# Patient Record
(demographics unavailable — no encounter records)

---

## 2025-05-06 NOTE — ASSESSMENT
[Use of independent historian: [ enter independent historian's relationship to patient ] :____] : As the patient was unable to provide a complete and reliable history, I obtained clinical history from the patient's [unfilled] [FreeTextEntry1] :  #Atopic dermatitis: We discussed that atopic dermatitis is a chronic condition that flares from time to time. Some kids outgrow their eczema while others do not. It is important to continue sensitive skin care between flares and use medications as needed. Eczema can lead to temporary light or dark spots that improve when the eczema resolves. All caretakers should avoid perfumes and use free and clear laundry detergents. Avoid use of air fresheners. -Start triamcinolone 0.1% ointment once daily to rough red scaly areas -Reviewed sensitive skin care products and discussed recommended changes  #CMC -Medium sized DDx of two patches CMN v cafe au lait -Monitor  FU 8 weeks

## 2025-05-06 NOTE — PHYSICAL EXAM
[Alert] : alert [Well Nourished] : well nourished [FreeTextEntry3] : Ill defined erythematous rough scaly patches with excoriations on forehead, trunk, ext Brown linear thin plaque with globular pigment pattern and two focal homogenous brown macules located on the left dorsal hand Ovoid brown patches on right upper arm, left posterior thigh

## 2025-05-06 NOTE — HISTORY OF PRESENT ILLNESS
[FreeTextEntry1] : NPV: nevus and rash [de-identified] : CAITLYN MCCORMICK is a 3 month old who is presenting for evaluation of:   1. Eczema born with red rough skin, hasn't resolved Located on scalp, face, trunk, extremities - diaper area spared Pruritic Cerave baby wash once per week, coconut oil/aloe vera home made ointment once per day, All free and clear for him but not caretakers Older sibling with more mild eczema  2. Born with three birthmarks - dark montserrat on left posterior hand, brown ovoid small patch on right upper arm, left thigh  Social hx: Here with mom

## 2025-05-06 NOTE — CONSULT LETTER
[Dear  ___] : Dear  [unfilled], [Consult Letter:] : I had the pleasure of evaluating your patient, [unfilled]. [Please see my note below.] : Please see my note below. [Consult Closing:] : Thank you very much for allowing me to participate in the care of this patient.  If you have any questions, please do not hesitate to contact me. [Sincerely,] : Sincerely, [FreeTextEntry3] :  Jia Conde MD  [facility] Pediatric Dermatology

## 2025-07-08 NOTE — ASSESSMENT
[Use of independent historian: [ enter independent historian's relationship to patient ] :____] : As the patient was unable to provide a complete and reliable history, I obtained clinical history from the patient's [unfilled] [FreeTextEntry1] :  #Atopic dermatitis, mod-severe exacerbation of chronic disease. a/w xerosis - Reviewed risks (as well as mitigation strategies for adverse drug events as applicable), benefits, and alternatives of therapy - FOR FACE: start hydrocortisone 2.5% ointment BID to rough areas while they're rough. SED. Use vaseline as a barrier on face before/after meals. - FOR SCALP AND FOLDS: mix hydrocortisone 2.5% and ketoconazole BID to AA as needed  - FOR BODY: Start mometasone 0.1% ointment BID to AA prn itchy/rash while its rough + 2 days, side effects discussed. Avoid use on face/groin.  - FOR MAINTENANCE: can use eucrisa (or elidel) BID to affected areas for 1-2 weeks and then 2x/week.  - Discussed side effects of steroids including skin thinning, stretch marks, steroid acne. Parents agree to use steroids as prescribed.  - Use vaseline in diaper area and for face- educated on sweat trapping and heat rash iso summer - Wash with CLN 2-3x/week   #Xerosis Dry skin care reviewed:  - Take short showers/baths (avoid hot water)  - Use a mild soap (eg. CeraVe cleanser or Aquaphor)  - Use soap only on areas truly needed (underarms,groin,buttocks,fold areas, feet, face, hair)  - Pat off excess water and put moisturizer on immediately (within 3 min.) Good moisturizing choices include:  1. Cetaphil cream (not baby Cetaphil)  2. CeraVe cream  3. Vanicream cream  4. Aquaphor ointment  5. Vaseline ointment  6. CeraVe ointment  - A moisturizer should always be applied after showering or bathing, but may be applied as many additional times as is necessary.  #Dermatographism START Zyrtec 2ml once a day   #CALM x 2 - Reassurance provided, continue to monitor  #Congenital melanocytic nevus - Reassurance provided, continue to monitor, photos taken today   RTC 2-3 weeks

## 2025-07-08 NOTE — CONSULT LETTER
[Dear  ___] : Dear  [unfilled], [Consult Letter:] : I had the pleasure of evaluating your patient, [unfilled]. [Please see my note below.] : Please see my note below. [Consult Closing:] : Thank you very much for allowing me to participate in the care of this patient.  If you have any questions, please do not hesitate to contact me. [Sincerely,] : Sincerely, [FreeTextEntry3] : Tara Lindsey MD Pediatric Dermatology [facility]

## 2025-07-08 NOTE — PHYSICAL EXAM
[FreeTextEntry3] : diffuse xerosis red rough thin plaques in flexures  greasy scale in scalp  brown linear patch on left dorsal hand, 2.6x0.7cm 2 light brown uniform macules- one on right upper arm, left upper medial thigh

## 2025-07-08 NOTE — HISTORY OF PRESENT ILLNESS
[FreeTextEntry1] : RPV: eczema [de-identified] : CAITLYN MCCORMICK is a 5 month old who is presenting for evaluation of:   1. Eczema- LV with Dr. Conde 5/6 - since then they have not used the triamcinolone. Only have used hydrocortisone 1% OTC and 2.5% cream mixed with vaseline after baths. Bathing 2-3x/week. Report skin is better today than it was in May. Since LV saw an allergist who did skin testing which was largely negative. Allergist prescribed eucrisa which they have been using and elidel which they didn't start. Recommended children's Zyrtec 2.5ml but they didn't start it yet.   S: Aquaphor M: Vaseline D: All free and clear  Here with mom and dad

## 2025-07-08 NOTE — PHYSICAL EXAM
[FreeTextEntry3] : diffuse xerosis red rough thin plaques in flexures  greasy scale in scalp  brown linear patch on left dorsal hand, 2.6x0.7cm 2 light brown uniform macules- one on right upper arm, left upper medial thigh  none

## 2025-07-08 NOTE — CONSULT LETTER
[Dear  ___] : Dear  [unfilled], [Consult Letter:] : I had the pleasure of evaluating your patient, [unfilled]. [Please see my note below.] : Please see my note below. [Consult Closing:] : Thank you very much for allowing me to participate in the care of this patient.  If you have any questions, please do not hesitate to contact me. [Sincerely,] : Sincerely, [FreeTextEntry3] : Tara Lindsey MD Pediatric Dermatology [facility]

## 2025-07-08 NOTE — HISTORY OF PRESENT ILLNESS
[FreeTextEntry1] : RPV: eczema [de-identified] : CAITLYN MCCORMICK is a 5 month old who is presenting for evaluation of:   1. Eczema- LV with Dr. Conde 5/6 - since then they have not used the triamcinolone. Only have used hydrocortisone 1% OTC and 2.5% cream mixed with vaseline after baths. Bathing 2-3x/week. Report skin is better today than it was in May. Since LV saw an allergist who did skin testing which was largely negative. Allergist prescribed eucrisa which they have been using and elidel which they didn't start. Recommended children's Zyrtec 2.5ml but they didn't start it yet.   S: Aquaphor M: Vaseline D: All free and clear  Here with mom and dad